# Patient Record
Sex: MALE | Race: WHITE
[De-identification: names, ages, dates, MRNs, and addresses within clinical notes are randomized per-mention and may not be internally consistent; named-entity substitution may affect disease eponyms.]

---

## 2018-01-09 ENCOUNTER — HOSPITAL ENCOUNTER (OUTPATIENT)
Dept: HOSPITAL 45 - C.RAD1850 | Age: 78
Discharge: HOME | End: 2018-01-09
Attending: INTERNAL MEDICINE
Payer: MEDICARE

## 2018-01-09 DIAGNOSIS — M79.642: Primary | ICD-10-CM

## 2018-01-09 NOTE — DIAGNOSTIC IMAGING REPORT
L HAND MIN 3 VIEWS ROUTINE



CLINICAL HISTORY: M79.643 Hand [id number]



COMPARISON: None.



DISCUSSION: Moderate generalized periarticular osteopenia. Several small

marginal erosions. Mild soft tissue edema. Intercarpal degenerative change most

prominent at the first carpometacarpal joint. There is no evidence for soft

tissue swelling.



IMPRESSION: Degenerative change with components of periarticular osteopenia.

Possibility of rheumatoid is considered.











The above report was generated using voice recognition software.  It may contain

grammatical, syntax or spelling errors.







Electronically signed by:  Augustine Hopson M.D.

1/9/2018 10:53 AM



Dictated Date/Time:  1/9/2018 10:51 AM

## 2018-02-13 ENCOUNTER — HOSPITAL ENCOUNTER (OUTPATIENT)
Dept: HOSPITAL 45 - C.LAB1850 | Age: 78
Discharge: HOME | End: 2018-02-13
Attending: INTERNAL MEDICINE
Payer: MEDICARE

## 2018-02-13 DIAGNOSIS — E80.6: Primary | ICD-10-CM

## 2018-02-13 DIAGNOSIS — E78.5: ICD-10-CM

## 2018-02-13 DIAGNOSIS — N52.9: ICD-10-CM

## 2018-02-13 DIAGNOSIS — M19.039: ICD-10-CM

## 2018-02-13 LAB
ALBUMIN SERPL-MCNC: 3.7 GM/DL (ref 3.4–5)
ALP SERPL-CCNC: 69 U/L (ref 45–117)
ALT SERPL-CCNC: 26 U/L (ref 12–78)
AST SERPL-CCNC: 14 U/L (ref 15–37)
BUN SERPL-MCNC: 18 MG/DL (ref 7–18)
CALCIUM SERPL-MCNC: 8.6 MG/DL (ref 8.5–10.1)
CO2 SERPL-SCNC: 23 MMOL/L (ref 21–32)
CREAT SERPL-MCNC: 0.97 MG/DL (ref 0.6–1.4)
GLUCOSE SERPL-MCNC: 90 MG/DL (ref 70–99)
KETONES UR QL STRIP: 116 MG/DL
PH UR: 196 MG/DL (ref 0–200)
POTASSIUM SERPL-SCNC: 4.1 MMOL/L (ref 3.5–5.1)
PROT SERPL-MCNC: 6.8 GM/DL (ref 6.4–8.2)
SODIUM SERPL-SCNC: 140 MMOL/L (ref 136–145)